# Patient Record
Sex: FEMALE | Race: BLACK OR AFRICAN AMERICAN | NOT HISPANIC OR LATINO | URBAN - METROPOLITAN AREA
[De-identification: names, ages, dates, MRNs, and addresses within clinical notes are randomized per-mention and may not be internally consistent; named-entity substitution may affect disease eponyms.]

---

## 2019-01-25 ENCOUNTER — INPATIENT (INPATIENT)
Facility: HOSPITAL | Age: 64
LOS: 3 days | Discharge: HOME | End: 2019-01-29
Attending: INTERNAL MEDICINE | Admitting: INTERNAL MEDICINE
Payer: MEDICARE

## 2019-01-26 VITALS
TEMPERATURE: 98 F | HEART RATE: 87 BPM | RESPIRATION RATE: 22 BRPM | OXYGEN SATURATION: 98 % | SYSTOLIC BLOOD PRESSURE: 151 MMHG | DIASTOLIC BLOOD PRESSURE: 64 MMHG

## 2019-01-26 DIAGNOSIS — Z29.9 ENCOUNTER FOR PROPHYLACTIC MEASURES, UNSPECIFIED: ICD-10-CM

## 2019-01-26 DIAGNOSIS — J45.909 UNSPECIFIED ASTHMA, UNCOMPLICATED: ICD-10-CM

## 2019-01-26 DIAGNOSIS — I63.9 CEREBRAL INFARCTION, UNSPECIFIED: ICD-10-CM

## 2019-01-26 DIAGNOSIS — R42 DIZZINESS AND GIDDINESS: ICD-10-CM

## 2019-01-26 DIAGNOSIS — I72.0 ANEURYSM OF CAROTID ARTERY: ICD-10-CM

## 2019-01-26 DIAGNOSIS — I10 ESSENTIAL (PRIMARY) HYPERTENSION: ICD-10-CM

## 2019-01-26 DIAGNOSIS — Z98.890 OTHER SPECIFIED POSTPROCEDURAL STATES: Chronic | ICD-10-CM

## 2019-01-26 LAB
ALBUMIN SERPL ELPH-MCNC: 4.2 G/DL — SIGNIFICANT CHANGE UP (ref 3.3–5)
ALP SERPL-CCNC: 85 U/L — SIGNIFICANT CHANGE UP (ref 40–120)
ALT FLD-CCNC: 13 U/L — SIGNIFICANT CHANGE UP (ref 4–33)
ANION GAP SERPL CALC-SCNC: 10 MMO/L — SIGNIFICANT CHANGE UP (ref 7–14)
ANION GAP SERPL CALC-SCNC: 13 MMO/L — SIGNIFICANT CHANGE UP (ref 7–14)
APPEARANCE UR: CLEAR — SIGNIFICANT CHANGE UP
APTT BLD: 27.2 SEC — LOW (ref 27.5–36.3)
AST SERPL-CCNC: 23 U/L — SIGNIFICANT CHANGE UP (ref 4–32)
BACTERIA # UR AUTO: NEGATIVE — SIGNIFICANT CHANGE UP
BASE EXCESS BLDV CALC-SCNC: 2.8 MMOL/L — SIGNIFICANT CHANGE UP
BASOPHILS # BLD AUTO: 0.01 K/UL — SIGNIFICANT CHANGE UP (ref 0–0.2)
BASOPHILS NFR BLD AUTO: 0.2 % — SIGNIFICANT CHANGE UP (ref 0–2)
BILIRUB SERPL-MCNC: 0.3 MG/DL — SIGNIFICANT CHANGE UP (ref 0.2–1.2)
BILIRUB UR-MCNC: NEGATIVE — SIGNIFICANT CHANGE UP
BLD GP AB SCN SERPL QL: NEGATIVE — SIGNIFICANT CHANGE UP
BLOOD GAS VENOUS - CREATININE: 0.86 MG/DL — SIGNIFICANT CHANGE UP (ref 0.5–1.3)
BLOOD UR QL VISUAL: NEGATIVE — SIGNIFICANT CHANGE UP
BUN SERPL-MCNC: 17 MG/DL — SIGNIFICANT CHANGE UP (ref 7–23)
BUN SERPL-MCNC: 17 MG/DL — SIGNIFICANT CHANGE UP (ref 7–23)
CALCIUM SERPL-MCNC: 8.9 MG/DL — SIGNIFICANT CHANGE UP (ref 8.4–10.5)
CALCIUM SERPL-MCNC: 9 MG/DL — SIGNIFICANT CHANGE UP (ref 8.4–10.5)
CHLORIDE BLDV-SCNC: 109 MMOL/L — HIGH (ref 96–108)
CHLORIDE SERPL-SCNC: 107 MMOL/L — SIGNIFICANT CHANGE UP (ref 98–107)
CHLORIDE SERPL-SCNC: 99 MMOL/L — SIGNIFICANT CHANGE UP (ref 98–107)
CO2 SERPL-SCNC: 25 MMOL/L — SIGNIFICANT CHANGE UP (ref 22–31)
CO2 SERPL-SCNC: 25 MMOL/L — SIGNIFICANT CHANGE UP (ref 22–31)
COLOR SPEC: YELLOW — SIGNIFICANT CHANGE UP
CREAT SERPL-MCNC: 1.06 MG/DL — SIGNIFICANT CHANGE UP (ref 0.5–1.3)
CREAT SERPL-MCNC: 1.09 MG/DL — SIGNIFICANT CHANGE UP (ref 0.5–1.3)
EOSINOPHIL # BLD AUTO: 0.04 K/UL — SIGNIFICANT CHANGE UP (ref 0–0.5)
EOSINOPHIL NFR BLD AUTO: 0.6 % — SIGNIFICANT CHANGE UP (ref 0–6)
GAS PNL BLDV: 134 MMOL/L — LOW (ref 136–146)
GLUCOSE BLDV-MCNC: 186 — HIGH (ref 70–99)
GLUCOSE SERPL-MCNC: 178 MG/DL — HIGH (ref 70–99)
GLUCOSE SERPL-MCNC: 75 MG/DL — SIGNIFICANT CHANGE UP (ref 70–99)
GLUCOSE UR-MCNC: NEGATIVE — SIGNIFICANT CHANGE UP
HBA1C BLD-MCNC: 5.1 % — SIGNIFICANT CHANGE UP (ref 4–5.6)
HCO3 BLDV-SCNC: 26 MMOL/L — SIGNIFICANT CHANGE UP (ref 20–27)
HCT VFR BLD CALC: 37 % — SIGNIFICANT CHANGE UP (ref 34.5–45)
HCT VFR BLD CALC: 39.1 % — SIGNIFICANT CHANGE UP (ref 34.5–45)
HCT VFR BLDV CALC: 40 % — SIGNIFICANT CHANGE UP (ref 34.5–45)
HGB BLD-MCNC: 11.9 G/DL — SIGNIFICANT CHANGE UP (ref 11.5–15.5)
HGB BLD-MCNC: 12.5 G/DL — SIGNIFICANT CHANGE UP (ref 11.5–15.5)
HGB BLDV-MCNC: 13 G/DL — SIGNIFICANT CHANGE UP (ref 11.5–15.5)
HYALINE CASTS # UR AUTO: NEGATIVE — SIGNIFICANT CHANGE UP
IMM GRANULOCYTES NFR BLD AUTO: 0.3 % — SIGNIFICANT CHANGE UP (ref 0–1.5)
INR BLD: 1.21 — HIGH (ref 0.88–1.17)
KETONES UR-MCNC: NEGATIVE — SIGNIFICANT CHANGE UP
LACTATE BLDV-MCNC: 1.3 MMOL/L — SIGNIFICANT CHANGE UP (ref 0.5–2)
LEUKOCYTE ESTERASE UR-ACNC: NEGATIVE — SIGNIFICANT CHANGE UP
LYMPHOCYTES # BLD AUTO: 1.04 K/UL — SIGNIFICANT CHANGE UP (ref 1–3.3)
LYMPHOCYTES # BLD AUTO: 16.4 % — SIGNIFICANT CHANGE UP (ref 13–44)
MAGNESIUM SERPL-MCNC: 2.3 MG/DL — SIGNIFICANT CHANGE UP (ref 1.6–2.6)
MCHC RBC-ENTMCNC: 32 % — SIGNIFICANT CHANGE UP (ref 32–36)
MCHC RBC-ENTMCNC: 32.2 % — SIGNIFICANT CHANGE UP (ref 32–36)
MCHC RBC-ENTMCNC: 33.1 PG — SIGNIFICANT CHANGE UP (ref 27–34)
MCHC RBC-ENTMCNC: 33.3 PG — SIGNIFICANT CHANGE UP (ref 27–34)
MCV RBC AUTO: 102.8 FL — HIGH (ref 80–100)
MCV RBC AUTO: 104.3 FL — HIGH (ref 80–100)
MONOCYTES # BLD AUTO: 0.39 K/UL — SIGNIFICANT CHANGE UP (ref 0–0.9)
MONOCYTES NFR BLD AUTO: 6.2 % — SIGNIFICANT CHANGE UP (ref 2–14)
NEUTROPHILS # BLD AUTO: 4.84 K/UL — SIGNIFICANT CHANGE UP (ref 1.8–7.4)
NEUTROPHILS NFR BLD AUTO: 76.3 % — SIGNIFICANT CHANGE UP (ref 43–77)
NITRITE UR-MCNC: NEGATIVE — SIGNIFICANT CHANGE UP
NRBC # FLD: 0 K/UL — LOW (ref 25–125)
NRBC # FLD: 0 K/UL — LOW (ref 25–125)
PCO2 BLDV: 46 MMHG — SIGNIFICANT CHANGE UP (ref 41–51)
PH BLDV: 7.39 PH — SIGNIFICANT CHANGE UP (ref 7.32–7.43)
PH UR: 6 — SIGNIFICANT CHANGE UP (ref 5–8)
PLATELET # BLD AUTO: 272 K/UL — SIGNIFICANT CHANGE UP (ref 150–400)
PLATELET # BLD AUTO: 277 K/UL — SIGNIFICANT CHANGE UP (ref 150–400)
PMV BLD: 10 FL — SIGNIFICANT CHANGE UP (ref 7–13)
PMV BLD: 10.6 FL — SIGNIFICANT CHANGE UP (ref 7–13)
PO2 BLDV: 32 MMHG — LOW (ref 35–40)
POTASSIUM BLDV-SCNC: 3 MMOL/L — LOW (ref 3.4–4.5)
POTASSIUM SERPL-MCNC: 3.2 MMOL/L — LOW (ref 3.5–5.3)
POTASSIUM SERPL-MCNC: 3.6 MMOL/L — SIGNIFICANT CHANGE UP (ref 3.5–5.3)
POTASSIUM SERPL-SCNC: 3.2 MMOL/L — LOW (ref 3.5–5.3)
POTASSIUM SERPL-SCNC: 3.6 MMOL/L — SIGNIFICANT CHANGE UP (ref 3.5–5.3)
PROT SERPL-MCNC: 7 G/DL — SIGNIFICANT CHANGE UP (ref 6–8.3)
PROT UR-MCNC: 20 — SIGNIFICANT CHANGE UP
PROTHROM AB SERPL-ACNC: 13.9 SEC — HIGH (ref 9.8–13.1)
RBC # BLD: 3.6 M/UL — LOW (ref 3.8–5.2)
RBC # BLD: 3.75 M/UL — LOW (ref 3.8–5.2)
RBC # FLD: 12.4 % — SIGNIFICANT CHANGE UP (ref 10.3–14.5)
RBC # FLD: 12.6 % — SIGNIFICANT CHANGE UP (ref 10.3–14.5)
RBC CASTS # UR COMP ASSIST: SIGNIFICANT CHANGE UP (ref 0–?)
RH IG SCN BLD-IMP: POSITIVE — SIGNIFICANT CHANGE UP
SAO2 % BLDV: 53.8 % — LOW (ref 60–85)
SODIUM SERPL-SCNC: 137 MMOL/L — SIGNIFICANT CHANGE UP (ref 135–145)
SODIUM SERPL-SCNC: 142 MMOL/L — SIGNIFICANT CHANGE UP (ref 135–145)
SP GR SPEC: 1.04 — SIGNIFICANT CHANGE UP (ref 1–1.04)
SQUAMOUS # UR AUTO: SIGNIFICANT CHANGE UP
TROPONIN T, HIGH SENSITIVITY: 8 NG/L — SIGNIFICANT CHANGE UP (ref ?–14)
TROPONIN T, HIGH SENSITIVITY: < 6 NG/L — SIGNIFICANT CHANGE UP (ref ?–14)
TSH SERPL-MCNC: 0.35 UIU/ML — SIGNIFICANT CHANGE UP (ref 0.27–4.2)
UROBILINOGEN FLD QL: NORMAL — SIGNIFICANT CHANGE UP
WBC # BLD: 4.6 K/UL — SIGNIFICANT CHANGE UP (ref 3.8–10.5)
WBC # BLD: 6.34 K/UL — SIGNIFICANT CHANGE UP (ref 3.8–10.5)
WBC # FLD AUTO: 4.6 K/UL — SIGNIFICANT CHANGE UP (ref 3.8–10.5)
WBC # FLD AUTO: 6.34 K/UL — SIGNIFICANT CHANGE UP (ref 3.8–10.5)
WBC UR QL: SIGNIFICANT CHANGE UP (ref 0–?)

## 2019-01-26 PROCEDURE — 70498 CT ANGIOGRAPHY NECK: CPT | Mod: 26

## 2019-01-26 PROCEDURE — 99223 1ST HOSP IP/OBS HIGH 75: CPT

## 2019-01-26 PROCEDURE — 70496 CT ANGIOGRAPHY HEAD: CPT | Mod: 26

## 2019-01-26 PROCEDURE — 70450 CT HEAD/BRAIN W/O DYE: CPT | Mod: 26

## 2019-01-26 RX ORDER — ENOXAPARIN SODIUM 100 MG/ML
40 INJECTION SUBCUTANEOUS EVERY 24 HOURS
Qty: 0 | Refills: 0 | Status: DISCONTINUED | OUTPATIENT
Start: 2019-01-26 | End: 2019-01-29

## 2019-01-26 RX ORDER — ASPIRIN/CALCIUM CARB/MAGNESIUM 324 MG
81 TABLET ORAL DAILY
Qty: 0 | Refills: 0 | Status: DISCONTINUED | OUTPATIENT
Start: 2019-01-27 | End: 2019-01-29

## 2019-01-26 RX ORDER — ATORVASTATIN CALCIUM 80 MG/1
80 TABLET, FILM COATED ORAL AT BEDTIME
Qty: 0 | Refills: 0 | Status: DISCONTINUED | OUTPATIENT
Start: 2019-01-26 | End: 2019-01-29

## 2019-01-26 RX ORDER — ASPIRIN/CALCIUM CARB/MAGNESIUM 324 MG
162 TABLET ORAL ONCE
Qty: 0 | Refills: 0 | Status: COMPLETED | OUTPATIENT
Start: 2019-01-26 | End: 2019-01-26

## 2019-01-26 RX ORDER — ALBUTEROL 90 UG/1
2 AEROSOL, METERED ORAL
Qty: 0 | Refills: 0 | COMMUNITY

## 2019-01-26 RX ORDER — POTASSIUM CHLORIDE 20 MEQ
40 PACKET (EA) ORAL ONCE
Qty: 0 | Refills: 0 | Status: COMPLETED | OUTPATIENT
Start: 2019-01-26 | End: 2019-01-26

## 2019-01-26 RX ADMIN — Medication 40 MILLIEQUIVALENT(S): at 13:03

## 2019-01-26 RX ADMIN — ENOXAPARIN SODIUM 40 MILLIGRAM(S): 100 INJECTION SUBCUTANEOUS at 13:03

## 2019-01-26 RX ADMIN — ATORVASTATIN CALCIUM 80 MILLIGRAM(S): 80 TABLET, FILM COATED ORAL at 23:42

## 2019-01-26 RX ADMIN — Medication 162 MILLIGRAM(S): at 01:07

## 2019-01-26 NOTE — H&P ADULT - PROBLEM SELECTOR PLAN 3
albuterol prn at home for seasonal allergies, not in distress at this time Case d/w neurology who recommended speaking with neurosurgery  -Recommend outpatient f/u with Dr. Hagan for possible serial imaging of the pseudoaneurysm

## 2019-01-26 NOTE — H&P ADULT - NEGATIVE OPHTHALMOLOGIC SYMPTOMS
no loss of vision R/no diplopia/no photophobia/no loss of vision L no loss of vision L/no loss of vision R/no diplopia/no photophobia/no blurred vision L/no blurred vision R

## 2019-01-26 NOTE — H&P ADULT - NSHPSOCIALHISTORY_GEN_ALL_CORE
Marital Status: Single, lives alone    Occupation: Retired, used to work in childcare    Tobacco Use: neg    ETOH Use: Wine socially    Flu Vaccine:     neg                             Pneumonia Vaccine:  neg

## 2019-01-26 NOTE — ED PROVIDER NOTE - OBJECTIVE STATEMENT
63F p/w difficulty speaking since 930pm this evening. patient feels like she cant get the words out of her mouth, like her mouth is very dry. adds that she also feels tingling all over her body from her toes to her fingers bilaterally. all sx are new, nothing similar in the past. denies any weakness.

## 2019-01-26 NOTE — ED PROVIDER NOTE - ATTENDING CONTRIBUTION TO CARE
Maryse: I have seen and examined the patient face to face, have reviewed and addended the HPI, PE and a/p as necessary.    62 yo F HTN, vertigo a/w difficulty speaking starting at 930PM.  Patient arrived and stroke code called from triage.  Patient seen and examined by neurology.  Patient reports feeling OK throughout today, and daughter noted to have acute change in speech with difficulty speaking.  Patient reported diffuse tingling all over her body on both sides.  Reports intermittent dizziness, Denies any weakness.      GEN - NAD; well appearing; A+O x3; non-toxic appearing CARD -s1s2, RRR, no M,G,R; PULM - CTA b/l, symmetric breath sounds; ABD:  +BS, ND, NT, soft, no guarding, no rebound, no masses; BACK: no CVA tenderness, Normal  spine; EXT: symmetric pulses, 2+ dp, capillary refill < 2 seconds, no clubbing, no cyanosis, no edema NEURO: Speech slow intact, noted to have no word finding difficulties, R gaze nystagmus which extinguishes, finger to nose intact with no dysmetry, heel to shin intact bilaterally, No focal motor deficits.      Discussed case with neurology resident, at this time patient only has slow speech, no word finding difficulties no slurred speech and no focal motor deficits, no sensory deficits.  CTH neg for acute ICH. No tPA at this time.  Will obtain an MRI MRA and likely admit for a stroke workup.

## 2019-01-26 NOTE — H&P ADULT - NEGATIVE ENMT SYMPTOMS
no hearing difficulty/no nasal congestion/no nasal obstruction/no sinus symptoms/no post-nasal discharge/no nasal discharge

## 2019-01-26 NOTE — CONSULT NOTE ADULT - PROBLEM SELECTOR RECOMMENDATION 9
peripheral vs. central origin. NIHSS 0, however with questionable intention tremor vs. very mild dysmetria, not an ideal candidate for tpa nonetheless, bleeding risks outweigh benefit.   Plan:   -CTH   -CTA H/N w/ contrast  -MRI brain w/o contrast w/ thin sections through acoustic meatus (r/o vestibular dysfunction)   -valium for dizziness symptoms  -start atleast ASA 81mg for now until MRI brain r/o stroke completed  -c/w secondary stroke prevention w/ risk factor control  -IVFs and supportive care w/ regards to nausea   -PT/OT/vestibular therapy if needed peripheral vs. central origin. NIHSS 0, however with questionable intention tremor vs. very mild dysmetria, not an ideal candidate for tpa nonetheless, bleeding risks outweigh benefit.   Plan:   -CTH   -CTA H/N w/ contrast  -MRI brain w/o contrast w/ thin sections through acoustic meatus (r/o vestibular dysfunction)   -valium for dizziness symptoms (may also help w/ speech)  -start atleast ASA 81mg for now until MRI brain r/o stroke completed  -c/w secondary stroke prevention w/ risk factor control  -IVFs and supportive care w/ regards to nausea   -PT/OT/vestibular therapy if needed

## 2019-01-26 NOTE — CONSULT NOTE ADULT - SUBJECTIVE AND OBJECTIVE BOX
Neurology Consult    Name: RADHA HODGE    HPI: 64 yo right-handed AA woman who presents to [facility] w/ new onset slowed speech, onset 930pm (1/25/19). Exact LWK not known due to patient's associated symptoms of 2 day onset of nausea and 1 day onset (self-spinning) dizziness. ROS also revealed one episode of vomiting, dry mouth, generalized non-localized weakness, and tingling (positive phenomenon) over whole body. Pertinent hx includes HTN (lifestyle controlled, 150/60 on admission), Vertigo on homeopathic medication never worked up outpatient. tPA not given on code stroke due to poor localization and mild severity of neurologic deficits. Risks of bleed outweigh benefits. NIHSS 0 MRS 0      PMH/PSH:   Asthma  HTN (lifestyle controlled)     Vertigo on homeopathic medication     MEDICATIONS  (STANDING):    MEDICATIONS  (PRN):    Allergies  penicillin (Anaphylaxis; Hives)  shellfish (Anaphylaxis; Hives)    Objective:   Vital Signs Last 24 Hrs  T(C): 36.7 (26 Jan 2019 00:01), Max: 36.7 (26 Jan 2019 00:01)  T(F): 98 (26 Jan 2019 00:01), Max: 98 (26 Jan 2019 00:01)  HR: 87 (26 Jan 2019 00:01) (87 - 87)  BP: 151/64 (26 Jan 2019 00:01) (151/64 - 151/64)  RR: 22 (26 Jan 2019 00:01) (22 - 22)  SpO2: 98% (26 Jan 2019 00:01) (98% - 98%)    General Exam:   General appearance: No acute distress                   Neurological Exam:  Mental Status: AAOx3 (person, place, time), fluent w/ normal latency but slow rate of speech, can name objects, repetition intact, follows commands    Cranial Nerves: EOMI no sustained nystagmus or diplopia, PERRL, V1-V3 intact, facial symmetry intact, no dysarthria, tongue midline, VFF    Motor: 5/5 throughout. No drift x4    Sensation: Intact to LT throughout    Coordination: FTN/HTS intact b/l    tremor: slight intention tremors b/l UE,     Reflexes: 1+ bilateral biceps, brachioradialis, patellar    toes: downgoing b/l    Gait: not assessed    Other: normal tone, + head impulse test       Labs:                Radiology    < from: CT Brain Stroke Protocol (01.26.19 @ 00:16) >    INTERPRETATION:  HISTORY: Dizziness, no focal neurologic deficits.    COMPARISON: None    TECHNIQUE: Axial noncontrast CT images from the skull base to the vertex   were obtained and submitted for interpretation. Coronal and sagittal   reformatted images were performed. Bone and soft tissue windows were   evaluated.    FINDINGS:   No CT evidence of an acute territorial infarct.  No acute hemorrhage, mass effect, midlineshift or herniation.  Basal cisterns are patent.   The ventricles, and sulci are normal in size for the patient's age.    Paranasal sinuses and mastoid air cells are clear. Calvarium is intact.   No scalp hematoma.    IMPRESSION:   No CT evidence of an acute territorial infarct.  No acute intracranial bleeding, mass effect, or shift.     These findings were discussed with Dr. Majano at 1/26/2019 12:15 AM by Dr. Bryant with read back confirmation.    < end of copied text >

## 2019-01-26 NOTE — ED ADULT TRIAGE NOTE - CHIEF COMPLAINT QUOTE
Pt c/o slurred speech, dry mouth, dizziness, pt having obvious slurred speech and difficulty forming her words, unsteady gait. Pt daughter providing hx. Pt reports symptoms onset of symptoms at 930pm.  Pt went to [facility] and they sent straight to ED.  PMHx HTN (not on meds), Asthma, Vertigo.  Denies chest pain/headaches or trauma/falls.  BGFS 150 in triage.  No facial droop noted, pt reports RUArm sensation decreased. CODE STROKE called by ED MD Sierra, brought to CT and Charge RN notified.

## 2019-01-26 NOTE — H&P ADULT - HISTORY OF PRESENT ILLNESS
62 y/o female, with a PmHx of HTN (diet controlled), Asthma, Vertigo, presented to the [facility] ED with dizziness. Pt states at about 2100hrs last night, she was sitting down watching television when she decided to get up and get something to drink. She states when she stood up she became very lightheaded, "fidgety", bilateral feet tingling and left wrist pressure.  She states she then took her BP with a home machine and is showed her bp to be 100/60. She then proceeded to call her daughter who then took her to an urgent care facility. As soon as she walked into the Urgent care facility she had an episode of vomiting and was then sent to the ED without being seen. She denies any fever, chills, chest pain, sob, recent travel. She states she did have some blurred vision and a slight HA associated with these symptoms. Her granddaughter was recently sick with a cold but she states she has had no symptoms. In the [facility] ED, a code stroke was called and she was assessed by [facility]. She was found to have an NIHSS of 0, MRS of 0. She states she is feeling much better now and she is back to her baseline. She was admitted to telemetry for r/o CVA vs Vestibular Dysfunction.    => of Note, she states she no longer takes BP meds anymore because after she started to walk about 4.5 miles everyday except Sunday and controlled her diet, her bp is now controlled 62 y/o female, with a PmHx of HTN (diet controlled), Asthma, vertigo (controlled with lavender oil), who presents to the [facility] ED with dizziness. Pt states at about 2100hrs last night, she was sitting down watching television when she decided to get up and get something to drink. She states when she stood up she became very lightheaded, "fidgety", bilateral feet tingling and left wrist pressure.  She states she then took her BP with a home machine and is showed her bp to be 100/60. She then proceeded to call her daughter who then took her to an urgent care facility. As soon as she walked into the Urgent care facility she had an episode of vomiting and was then sent to the ED without being seen. She denies any fever, chills, chest pain, sob, recent travel. She states she did have some blurred vision and a slight HA associated with these symptoms. Her granddaughter was recently sick with a cold but she states she has had no symptoms. In the [facility] ED, a code stroke was called and she was assessed by [facility]. She was found to have an NIHSS of 0, MRS of 0. She states she is feeling much better now and she is back to her baseline. She was admitted to telemetry for r/o CVA vs Vestibular dysfunction. Patient reports she does have a history of vertigo but it has never caused her to vomit and is usually does not impact her life. At present, she reports she is feeling improved and just tolerated eating lunch. She does have a bit of dizziness still but much improved from admission.     => of Note, she states she no longer takes BP meds anymore because after she started to walk about 4.5 miles everyday except Sunday and controlled her diet, her bp is now controlled

## 2019-01-26 NOTE — ED ADULT NURSE NOTE - CHIEF COMPLAINT QUOTE
Pt c/o slurred speech, dry mouth, dizziness, pt having obvious slurred speech and difficulty forming her words, unsteady gait. Pt daughter providing hx. Pt reports symptoms onset of symptoms at 930pm.  Pt went to [facility] and they sent straight to ED.  PMHx HTN (not on meds), Asthma, Vertigo.  Denies chest pain/headaches or trauma/falls.  BGFS 150 in triage.  No facial droop noted, pt reports RUArm sensation decreased. CODE STROKE called by ED MD Sierra, brought to CT and Charge RN notified.

## 2019-01-26 NOTE — H&P ADULT - PROBLEM SELECTOR PLAN 1
EKG/Telemetry, ce x 1 neg, CT head neg, CTA Head/Neck negative, House neuro c/s done, asa, statin, MRI brain to r/o cva vs vestibular dysfunction as per neuro, PT/OT/PM & R c/s, Echo w/bubble study -c/w telemetry monitoring  -f/u house neuro recommendations  -Continue with aspirin 81mg , asa, statin, MRI brain to r/o cva vs vestibular dysfunction as per neuro, PT/OT/PM & R c/s, Echo w/bubble study -c/w telemetry monitoring  -f/u house neuro recommendations  -Continue with aspirin 81mg , asa, statin, MRI brain to r/o cva vs vestibular dysfunction as per neuro,   -PT/OT/PM & R c/s  - Echo w/bubble study -c/w telemetry monitoring  -f/u house neuro recommendations  -Continue with aspirin 81mg ,start Lipitor 80mg po QHS   -MRI brain to r/o cva vs vestibular dysfunction as per neuro,   -PT/OT/PM & R c/s  - Echo w/bubble study

## 2019-01-26 NOTE — H&P ADULT - RS GEN PE MLT RESP DETAILS PC
airway patent/breath sounds equal/clear to auscultation bilaterally/good air movement/respirations non-labored/no chest wall tenderness/normal

## 2019-01-26 NOTE — H&P ADULT - PROBLEM SELECTOR PLAN 4
monitor bp, start meds if needed albuterol prn at home for seasonal allergies, not in distress at this time

## 2019-01-26 NOTE — ED ADULT NURSE NOTE - NSIMPLEMENTINTERV_GEN_ALL_ED
Implemented All Fall Risk Interventions:  [place] to call system. Call bell, personal items and telephone within reach. Instruct patient to call for assistance. Room bathroom lighting operational. Non-slip footwear when patient is off stretcher. Physically safe environment: no spills, clutter or unnecessary equipment. Stretcher in lowest position, wheels locked, appropriate side rails in place. Provide visual cue, wrist band, yellow gown, etc. Monitor gait and stability. Monitor for mental status changes and reorient to person, place, and time. Review medications for side effects contributing to fall risk. Reinforce activity limits and safety measures with patient and family.

## 2019-01-26 NOTE — H&P ADULT - ASSESSMENT
64 y/o female, with a PmHx of HTN (diet controlled), Asthma, Vertigo, presented to the [facility] ED with dizziness. She was admitted to telemetry for r/o stroke vs vestibular dysfunction. 62 y/o female, with a PmHx of HTN (diet controlled), Asthma, Vertigo, presented to the [facility] ED with dizziness. She was admitted to telemetry for r/o stroke vs vestibular dysfunction. 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

## 2019-01-26 NOTE — CONSULT NOTE ADULT - ATTENDING COMMENTS
Seen and examined on rounds with housestaff.  Presents with positional room spinning dizziness, intermittent R ear tinnitus in setting of HTN.  L hand tremoring/weakness; unclear if new.  CT head and CTA head and neck WNL.  Plan for MRI brain to r/o vascular event.   ASA 81 in the interim.

## 2019-01-26 NOTE — ED ADULT NURSE NOTE - OBJECTIVE STATEMENT
Pt received in spot 6 as a code stroke.  Pt  brought in by daughter with c/o slurred speech, dry mouth, dizziness, pt having obvious slurred speech and difficulty forming her words, unsteady gait.  since 930pm. Pt was evaluated at [facility] and sent to ED for r/o stroke.  Pt with hx of vertigo and states is experiencing "vertigo but worse".  No chest pain/HA.  No facial droop noted, but endorsing decreased sensation in R arm.  Pt unsteady on feet, with c/o tingling to B/L lower extremities.  Pt also endorsing one episode of vomiting.  Pt received from CT, IVL placed 20g to R AC.  Labs sent.  Placed on CM.  Neuro at bedside.  Report given to primary RN.

## 2019-01-26 NOTE — H&P ADULT - MUSCULOSKELETAL
details… No joint pain, swelling or deformity; no limitation of movement detailed exam no joint swelling/no joint erythema/ROM intact

## 2019-01-26 NOTE — PATIENT PROFILE ADULT - FUNCTIONAL SCREEN CURRENT LEVEL: DRESSING, MLM
Medication taken at 8 am & 1:15 pm for Lamotrigine  Blood drawn at 2:45 pm  Marguerite Sousa CMA    
0 = independent

## 2019-01-26 NOTE — H&P ADULT - NSHPLABSRESULTS_GEN_ALL_CORE
ALL LABS AND IMAGING PERSONALLY REVIEWED:                         11.9   4.60  )-----------( 277      ( 26 Jan 2019 12:40 )             37.0     01-26    142  |  107  |  17  ----------------------------<  75  3.6   |  25  |  1.09    Ca    8.9      26 Jan 2019 12:40  Mg     2.3     01-26    TPro  7.0  /  Alb  4.2  /  TBili  0.3  /  DBili  x   /  AST  23  /  ALT  13  /  AlkPhos  85  01-26    PT/INR - ( 26 Jan 2019 00:10 )   PT: 13.9 SEC;   INR: 1.21          PTT - ( 26 Jan 2019 00:10 )  PTT:27.2 SEC    Hemoglobin A1C, Whole Blood: 5.1  Thyroid Stimulating Hormone, Serum: 0.35 uIU/mL (01.26.19 @ 12:40)    EKG: NSR @72bpm, LVH, IVE=570    < from: CT Angio Neck w/ IV Cont (01.26.19 @ 02:41) >    IMPRESSION:       POSTCONTRAST HEAD CT:    No hydrocephalus, midline shift, parenchymal hemorrhage, vasogenic edema,   or evidence of acute territorial infarct.    INTRACRANIAL CTA:    No vascular aneurysm or flow-limiting stenosis.    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 The neck measures 2 to 3 mm in size. Findings likely   represent the presence of pseudoaneurysm in the setting of a healed   arterial dissection.    No flow-limiting stenosis. Carotid bifurcations unremarkable. No evidence   for acute arterial dissection.    < end of copied text >    < from: CT Brain Stroke Protocol (01.26.19 @ 00:16) >    IMPRESSION:   No CT evidence of an acute territorial infarct.  No acute intracranial bleeding, mass effect, or shift.     < end of copied text >

## 2019-01-26 NOTE — H&P ADULT - PROBLEM SELECTOR PLAN 2
if stoke w/u negative, consider meclizine Patient reports this well-controlled at home with lavender oil  -Can consider meclizine if symptoms persist  -f/u MRI results to assess for vestibular dysfunction per neurology

## 2019-01-26 NOTE — CONSULT NOTE ADULT - ASSESSMENT
62 yo right-handed AA woman who presents to [facility] w/ new onset slowed speech, onset 930pm (1/25/19). Exact LWK not known due to patient's associated symptoms of 2 day onset of nausea and 1 day onset (self-spinning) dizziness. ROS also revealed one episode of vomiting, dry mouth, generalized non-localized weakness, and tingling (positive phenomenon) over whole body. Pertinent hx includes HTN (lifestyle controlled, 150/60 on admission), Vertigo on homeopathic medication never worked up outpatient. tPA not given on code stroke due to poor localization and mild severity of neurologic deficits. Risks of bleed outweigh benefits. NIHSS 0 MRS 0      Pertinent positives on exam include slow rate of speech but fluent and non dysarthric. head impulse test +, b/l tremor w/ outstretched hand vs. possible but less likely mild dysmetria w/ FTN.

## 2019-01-26 NOTE — H&P ADULT - NSHPPHYSICALEXAM_GEN_ALL_CORE
Vital Signs Last 24 Hrs  T(C): 36.6 (26 Jan 2019 09:42), Max: 36.8 (26 Jan 2019 03:32)  T(F): 97.8 (26 Jan 2019 09:42), Max: 98.2 (26 Jan 2019 03:32)  HR: 69 (26 Jan 2019 09:42) (69 - 87)  BP: 112/64 (26 Jan 2019 09:42) (109/62 - 151/64)  BP(mean): --  RR: 17 (26 Jan 2019 09:42) (17 - 22)  SpO2: 97% (26 Jan 2019 09:42) (94% - 98%)

## 2019-01-26 NOTE — H&P ADULT - FAMILY HISTORY
Mother  Still living? No  Family history of cerebrovascular accident (CVA), Age at diagnosis: Age Unknown  Family history of glaucoma in mother, Age at diagnosis: Age Unknown  Family history of hypertension, Age at diagnosis: Age Unknown

## 2019-01-27 LAB
ANION GAP SERPL CALC-SCNC: 8 MMO/L — SIGNIFICANT CHANGE UP (ref 7–14)
BUN SERPL-MCNC: 17 MG/DL — SIGNIFICANT CHANGE UP (ref 7–23)
CALCIUM SERPL-MCNC: 8.8 MG/DL — SIGNIFICANT CHANGE UP (ref 8.4–10.5)
CHLORIDE SERPL-SCNC: 105 MMOL/L — SIGNIFICANT CHANGE UP (ref 98–107)
CHOLEST SERPL-MCNC: 128 MG/DL — SIGNIFICANT CHANGE UP (ref 120–199)
CO2 SERPL-SCNC: 26 MMOL/L — SIGNIFICANT CHANGE UP (ref 22–31)
CREAT SERPL-MCNC: 0.96 MG/DL — SIGNIFICANT CHANGE UP (ref 0.5–1.3)
GLUCOSE SERPL-MCNC: 90 MG/DL — SIGNIFICANT CHANGE UP (ref 70–99)
HCT VFR BLD CALC: 36.8 % — SIGNIFICANT CHANGE UP (ref 34.5–45)
HDLC SERPL-MCNC: 55 MG/DL — SIGNIFICANT CHANGE UP (ref 45–65)
HGB BLD-MCNC: 12.4 G/DL — SIGNIFICANT CHANGE UP (ref 11.5–15.5)
LIPID PNL WITH DIRECT LDL SERPL: 76 MG/DL — SIGNIFICANT CHANGE UP
MCHC RBC-ENTMCNC: 33.7 % — SIGNIFICANT CHANGE UP (ref 32–36)
MCHC RBC-ENTMCNC: 34.3 PG — HIGH (ref 27–34)
MCV RBC AUTO: 101.7 FL — HIGH (ref 80–100)
NRBC # FLD: 0 K/UL — LOW (ref 25–125)
PLATELET # BLD AUTO: 273 K/UL — SIGNIFICANT CHANGE UP (ref 150–400)
PMV BLD: 10.3 FL — SIGNIFICANT CHANGE UP (ref 7–13)
POTASSIUM SERPL-MCNC: 3.8 MMOL/L — SIGNIFICANT CHANGE UP (ref 3.5–5.3)
POTASSIUM SERPL-SCNC: 3.8 MMOL/L — SIGNIFICANT CHANGE UP (ref 3.5–5.3)
RBC # BLD: 3.62 M/UL — LOW (ref 3.8–5.2)
RBC # FLD: 12.8 % — SIGNIFICANT CHANGE UP (ref 10.3–14.5)
SODIUM SERPL-SCNC: 139 MMOL/L — SIGNIFICANT CHANGE UP (ref 135–145)
TRIGL SERPL-MCNC: 30 MG/DL — SIGNIFICANT CHANGE UP (ref 10–149)
WBC # BLD: 2.67 K/UL — LOW (ref 3.8–10.5)
WBC # FLD AUTO: 2.67 K/UL — LOW (ref 3.8–10.5)

## 2019-01-27 PROCEDURE — 99233 SBSQ HOSP IP/OBS HIGH 50: CPT

## 2019-01-27 RX ADMIN — ENOXAPARIN SODIUM 40 MILLIGRAM(S): 100 INJECTION SUBCUTANEOUS at 12:14

## 2019-01-27 RX ADMIN — Medication 81 MILLIGRAM(S): at 12:14

## 2019-01-27 RX ADMIN — ATORVASTATIN CALCIUM 80 MILLIGRAM(S): 80 TABLET, FILM COATED ORAL at 21:32

## 2019-01-27 NOTE — PROGRESS NOTE ADULT - ASSESSMENT
62 y/o W, with a PmHx of HTN (diet controlled), Asthma, Vertigo, presented to the [facility] ED with dizziness. She was admitted to telemetry for r/o stroke vs vestibular dysfunction. 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

## 2019-01-27 NOTE — PROGRESS NOTE ADULT - SUBJECTIVE AND OBJECTIVE BOX
Patient is a 63y old  Female who presents with a chief complaint of Dizziness (2019 12:29)      SUBJECTIVE / OVERNIGHT EVENTS: No events overnight. This AM, patient states dizziness is improved. 1 episode of vomiting last night. No f/c/n/v/d/cp/sob.    MEDICATIONS  (STANDING):  aspirin enteric coated 81 milliGRAM(s) Oral daily  atorvastatin 80 milliGRAM(s) Oral at bedtime  enoxaparin Injectable 40 milliGRAM(s) SubCutaneous every 24 hours    MEDICATIONS  (PRN):      PHYSICAL EXAM:  T(C): 36.6 (19 @ 12:13), Max: 36.8 (19 @ 14:52)  HR: 68 (19 @ 12:13) (62 - 86)  BP: 139/79 (19 @ 12:13) (117/59 - 148/85)  RR: 18 (19 @ 12:13) (17 - 18)  SpO2: 98% (19 @ 12:13) (96% - 99%)  I&O's Summary    GENERAL: NAD, well-developed, seated in bed, pleasant  HEAD:  Atraumatic, Normocephalic, MMM  EYES: EOMI, PERRLA, conjunctiva and sclera clear  CHEST/LUNG: Clear to auscultation bilaterally; No wheeze  HEART: Regular rate and rhythm; No murmurs, rubs, or gallops  ABDOMEN: Soft, Nontender, Nondistended; Bowel sounds present  EXTREMITIES:  2+ Peripheral Pulses, No clubbing, cyanosis, or edema  PSYCH: AAOx3  NEUROLOGY: CN II-XII grossly intact, moving all extremities    LABS:  CAPILLARY BLOOD GLUCOSE                              12.4   2.67  )-----------( 273      ( 2019 06:45 )             36.8         139  |  105  |  17  ----------------------------<  90  3.8   |  26  |  0.96    Ca    8.8      2019 06:45  Mg     2.3         TPro  7.0  /  Alb  4.2  /  TBili  0.3  /  DBili  x   /  AST  23  /  ALT  13  /  AlkPhos  85      PT/INR - ( 2019 00:10 )   PT: 13.9 SEC;   INR: 1.21          PTT - ( 2019 00:10 )  PTT:27.2 SEC      Urinalysis Basic - ( 2019 20:00 )    Color: YELLOW / Appearance: CLEAR / S.036 / pH: 6.0  Gluc: NEGATIVE / Ketone: NEGATIVE  / Bili: NEGATIVE / Urobili: NORMAL   Blood: NEGATIVE / Protein: 20 / Nitrite: NEGATIVE   Leuk Esterase: NEGATIVE / RBC: 0-2 / WBC 3-5   Sq Epi: FEW / Non Sq Epi: x / Bacteria: NEGATIVE        RADIOLOGY & ADDITIONAL TESTS:    Telemetry Personally Reviewed - Sinus rhythm, 60s    Imaging Personally Reviewed -     Imaging Reviewed -     Consultant(s) Notes Reviewed -       Care Discussed with Consultants/Other Providers -

## 2019-01-27 NOTE — PROGRESS NOTE ADULT - PROBLEM SELECTOR PLAN 2
Patient reports this well-controlled at home with lavender oil  -Can consider meclizine if symptoms persist  -f/u MRI results to assess for vestibular dysfunction per neurology

## 2019-01-27 NOTE — PHYSICAL THERAPY INITIAL EVALUATION ADULT - PERTINENT HX OF CURRENT PROBLEM, REHAB EVAL
Pt. admitted with dizziness, r/o CVA. (-) CT head, however, (+) pseudoaneurysm. Pt. has hx of vertigo.

## 2019-01-27 NOTE — PROGRESS NOTE ADULT - PROBLEM SELECTOR PLAN 3
Admitting attending d/w neurology who recommended speaking with neurosurgery  -Neurosurgery recommend outpatient f/u with Dr. Hagan for possible serial imaging of the pseudoaneurysm

## 2019-01-27 NOTE — PROGRESS NOTE ADULT - PROBLEM SELECTOR PLAN 1
Dizziness is improving.  -c/w telemetry monitoring, no events thus far  -Continue with aspirin 81m, Lipitor 80mg po QHS   -MRI brain to r/o cva vs vestibular dysfunction as per neuro,   -PT - no skilled needs  - f/u OT/PM & R c/s  - Echo w/bubble study

## 2019-01-28 ENCOUNTER — TRANSCRIPTION ENCOUNTER (OUTPATIENT)
Age: 64
End: 2019-01-28

## 2019-01-28 LAB
ANION GAP SERPL CALC-SCNC: 11 MMO/L — SIGNIFICANT CHANGE UP (ref 7–14)
BUN SERPL-MCNC: 14 MG/DL — SIGNIFICANT CHANGE UP (ref 7–23)
CALCIUM SERPL-MCNC: 9.4 MG/DL — SIGNIFICANT CHANGE UP (ref 8.4–10.5)
CHLORIDE SERPL-SCNC: 104 MMOL/L — SIGNIFICANT CHANGE UP (ref 98–107)
CO2 SERPL-SCNC: 25 MMOL/L — SIGNIFICANT CHANGE UP (ref 22–31)
CREAT SERPL-MCNC: 0.98 MG/DL — SIGNIFICANT CHANGE UP (ref 0.5–1.3)
GLUCOSE SERPL-MCNC: 82 MG/DL — SIGNIFICANT CHANGE UP (ref 70–99)
HCT VFR BLD CALC: 40 % — SIGNIFICANT CHANGE UP (ref 34.5–45)
HGB BLD-MCNC: 12.8 G/DL — SIGNIFICANT CHANGE UP (ref 11.5–15.5)
MAGNESIUM SERPL-MCNC: 2.1 MG/DL — SIGNIFICANT CHANGE UP (ref 1.6–2.6)
MCHC RBC-ENTMCNC: 32 % — SIGNIFICANT CHANGE UP (ref 32–36)
MCHC RBC-ENTMCNC: 33.8 PG — SIGNIFICANT CHANGE UP (ref 27–34)
MCV RBC AUTO: 105.5 FL — HIGH (ref 80–100)
NRBC # FLD: 0 K/UL — LOW (ref 25–125)
PLATELET # BLD AUTO: 282 K/UL — SIGNIFICANT CHANGE UP (ref 150–400)
PMV BLD: 10.5 FL — SIGNIFICANT CHANGE UP (ref 7–13)
POTASSIUM SERPL-MCNC: 4.1 MMOL/L — SIGNIFICANT CHANGE UP (ref 3.5–5.3)
POTASSIUM SERPL-SCNC: 4.1 MMOL/L — SIGNIFICANT CHANGE UP (ref 3.5–5.3)
RBC # BLD: 3.79 M/UL — LOW (ref 3.8–5.2)
RBC # FLD: 12.5 % — SIGNIFICANT CHANGE UP (ref 10.3–14.5)
SODIUM SERPL-SCNC: 140 MMOL/L — SIGNIFICANT CHANGE UP (ref 135–145)
WBC # BLD: 4 K/UL — SIGNIFICANT CHANGE UP (ref 3.8–10.5)
WBC # FLD AUTO: 4 K/UL — SIGNIFICANT CHANGE UP (ref 3.8–10.5)

## 2019-01-28 PROCEDURE — 99232 SBSQ HOSP IP/OBS MODERATE 35: CPT

## 2019-01-28 PROCEDURE — 93306 TTE W/DOPPLER COMPLETE: CPT | Mod: 26

## 2019-01-28 PROCEDURE — 70551 MRI BRAIN STEM W/O DYE: CPT | Mod: 26

## 2019-01-28 RX ADMIN — Medication 81 MILLIGRAM(S): at 11:40

## 2019-01-28 RX ADMIN — ENOXAPARIN SODIUM 40 MILLIGRAM(S): 100 INJECTION SUBCUTANEOUS at 12:21

## 2019-01-28 RX ADMIN — ATORVASTATIN CALCIUM 80 MILLIGRAM(S): 80 TABLET, FILM COATED ORAL at 21:38

## 2019-01-28 NOTE — OCCUPATIONAL THERAPY INITIAL EVALUATION ADULT - PERTINENT HX OF CURRENT PROBLEM, REHAB EVAL
Pt is a 63 year old female with a PMHx of HTN (diet controlled), Asthma, & vertigo, who presented to [facility] on 1/26/19 with dizziness. CT Brain Scan on 1/26/19 displayed no CT evidence of an acute territorial infarct. No acute intracranial bleeding, mass effect, or shift.

## 2019-01-28 NOTE — DISCHARGE NOTE ADULT - CARE PROVIDER_API CALL
Lisandro Hagan), Neurological Surgery  [address] [place] [address]  Phone: [phone]  Fax: [phone] Lisandro Hagan), Neurological Surgery  [address]  [address] [place] [address]  Phone: [phone]  Fax: [phone]    Neurology Clinic,   [phone]  [address] [place] [address]. [address], [place] [address]  Phone: (   )    -  Fax: (   )    -

## 2019-01-28 NOTE — DISCHARGE NOTE ADULT - HOSPITAL COURSE
HPI:   62 y/o female, with a PmHx of HTN (diet controlled), Asthma, vertigo (controlled with lavender oil), who presents to the [facility] ED with dizziness. Pt states at about 2100hrs last night, she was sitting down watching television when she decided to get up and get something to drink. She states when she stood up she became very lightheaded, "fidgety", bilateral feet tingling and left wrist pressure.  She states she then took her BP with a home machine and is showed her bp to be 100/60. She then proceeded to call her daughter who then took her to an urgent care facility. As soon as she walked into the Urgent care facility she had an episode of vomiting and was then sent to the ED without being seen. She denies any fever, chills, chest pain, sob, recent travel. She states she did have some blurred vision and a slight HA associated with these symptoms. Her granddaughter was recently sick with a cold but she states she has had no symptoms. In the [facility] ED, a code stroke was called and she was assessed by [facility]. She was found to have an NIHSS of 0, MRS of 0. She states she is feeling much better now and she is back to her baseline. She was admitted to telemetry for r/o CVA vs Vestibular dysfunction. Patient reports she does have a history of vertigo but it has never caused her to vomit and is usually does not impact her life. At present, she reports she is feeling improved and just tolerated eating lunch. She does have a bit of dizziness still but much improved from admission.   => of Note, she states she no longer takes BP meds anymore because after she started to walk about 4.5 miles everyday except Sunday and controlled her diet, her bp is now controlled  EKG: NSR     Hospital Course:  Neurology evaluated the patient. NIHSS 0 MRS 0. Neuroimaging ordered to r/o vestibular dysfunction.      CT HEAD: No CT evidence of an acute territorial infarct. No acute intracranial bleeding, mass effect, or shift.   POSTCONTRAST HEAD CT: No hydrocephalus, midline shift, parenchymal hemorrhage, vasogenic edema, or evidence of acute territorial infarct.  INTRACRANIAL CTA: No vascular aneurysm or flow-limiting stenosis.  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 The neck measures 2 to 3 mm in size. 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 No flow-limiting stenosis. Carotid bifurcations unremarkable. No evidence for acute arterial dissection.  MRI HEAD: Mild microvascular ischemic change. No abnormal signal or filling defect within either IAC. No abnormal signal or filling defect within either membranous labyrinth.      Neurosurgery recommend outpatient f/u with Dr. Hagan for possible serial imaging of the pseudoaneurysm.   Physical Therapy Evaluation: Home with no PT needs.  OT Evaluation: Home without OT services       CTA head and neck- posteriorly directed aneurysm of the distal right internal carotid artery. The dome measures 2 to 3 mm in size. The neck measures 2 to 3 mm in size. 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     Case discussed with Dr. Eileen hernandez ............The patient is medically stable for discharge home and has appropriate follow up. HPI:   64 y/o female, with a PmHx of HTN (diet controlled), Asthma, vertigo (controlled with lavender oil), who presents to the [facility] ED with dizziness. Pt states at about 2100hrs last night, she was sitting down watching television when she decided to get up and get something to drink. She states when she stood up she became very lightheaded, "fidgety", bilateral feet tingling and left wrist pressure.  She states she then took her BP with a home machine and is showed her bp to be 100/60. She then proceeded to call her daughter who then took her to an urgent care facility. As soon as she walked into the Urgent care facility she had an episode of vomiting and was then sent to the ED without being seen. She denies any fever, chills, chest pain, sob, recent travel. She states she did have some blurred vision and a slight HA associated with these symptoms. Her granddaughter was recently sick with a cold but she states she has had no symptoms. In the [facility] ED, a code stroke was called and she was assessed by [facility]. She was found to have an NIHSS of 0, MRS of 0. She states she is feeling much better now and she is back to her baseline. She was admitted to telemetry for r/o CVA vs Vestibular dysfunction. Patient reports she does have a history of vertigo but it has never caused her to vomit and is usually does not impact her life. At present, she reports she is feeling improved and just tolerated eating lunch. She does have a bit of dizziness still but much improved from admission.   => of Note, she states she no longer takes BP meds anymore because after she started to walk about 4.5 miles everyday except Sunday and controlled her diet, her bp is now controlled  EKG: NSR     Hospital Course:  Neurology evaluated the patient. NIHSS 0 MRS 0. Neuroimaging ordered to rule out vestibular dysfunction.  Patient monitored on telemetry without events.     CT HEAD: No CT evidence of an acute territorial infarct. No acute intracranial bleeding, mass effect, or shift.   POSTCONTRAST HEAD CT: No hydrocephalus, midline shift, parenchymal hemorrhage, vasogenic edema, or evidence of acute territorial infarct.  INTRACRANIAL CTA: No vascular aneurysm or flow-limiting stenosis.  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 The neck measures 2 to 3 mm in size. 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 No flow-limiting stenosis. Carotid bifurcations unremarkable. No evidence for acute arterial dissection.  MRI HEAD: Mild microvascular ischemic change. No abnormal signal or filling defect within either IAC. No abnormal signal or filling defect within either membranous labyrinth.    Neurosurgery recommend outpatient follow up with Dr. Hagan for possible serial imaging of the pseudoaneurysm. 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    TTE:     Physical Therapy Evaluation: Home with no PT needs.  OT Evaluation: Home without OT services     Case discussed with Dr. Arellano on ............The patient is medically stable for discharge home and has appropriate follow up. HPI:   62 y/o female, with a PmHx of HTN (diet controlled), Asthma, vertigo (controlled with lavender oil), who presents to the [facility] ED with dizziness. Pt states at about 2100hrs last night, she was sitting down watching television when she decided to get up and get something to drink. She states when she stood up she became very lightheaded, "fidgety", bilateral feet tingling and left wrist pressure.  She states she then took her BP with a home machine and is showed her bp to be 100/60. She then proceeded to call her daughter who then took her to an urgent care facility. As soon as she walked into the Urgent care facility she had an episode of vomiting and was then sent to the ED without being seen. She denies any fever, chills, chest pain, sob, recent travel. She states she did have some blurred vision and a slight HA associated with these symptoms. Her granddaughter was recently sick with a cold but she states she has had no symptoms. In the [facility] ED, a code stroke was called and she was assessed by [facility]. She was found to have an NIHSS of 0, MRS of 0. She states she is feeling much better now and she is back to her baseline. She was admitted to telemetry for r/o CVA vs Vestibular dysfunction. Patient reports she does have a history of vertigo but it has never caused her to vomit and is usually does not impact her life. At present, she reports she is feeling improved and just tolerated eating lunch. She does have a bit of dizziness still but much improved from admission.   => of Note, she states she no longer takes BP meds anymore because after she started to walk about 4.5 miles everyday except Sunday and controlled her diet, her bp is now controlled  EKG: NSR     Hospital Course:  Neurology evaluated the patient. NIHSS 0 MRS 0. Neuroimaging ordered to rule out vestibular dysfunction.  Patient monitored on telemetry without events.     CT HEAD: No CT evidence of an acute territorial infarct. No acute intracranial bleeding, mass effect, or shift.   POSTCONTRAST HEAD CT: No hydrocephalus, midline shift, parenchymal hemorrhage, vasogenic edema, or evidence of acute territorial infarct.  INTRACRANIAL CTA: No vascular aneurysm or flow-limiting stenosis.  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 The neck measures 2 to 3 mm in size. 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 No flow-limiting stenosis. Carotid bifurcations unremarkable. No evidence for acute arterial dissection.  MRI HEAD: Mild microvascular ischemic change. No abnormal signal or filling defect within either IAC. No abnormal signal or filling defect within either membranous labyrinth.    Neurosurgery recommend outpatient follow up with Dr. Hagan for possible serial imaging of the pseudoaneurysm. 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    TTE:   1. Normal trileaflet aortic valve. Mild aortic regurgitation.  2. Increased relative wall thickness with normal left ventricular mass index, consistent with concentric left ventricular remodeling.  3. No segmental wall motion abnormalities. Hyperdynamic left ventricle.  4. Normal right ventricular size and function.  5. Normal tricuspid valve. Mild tricuspid regurgitation.  6. Agitated saline injection and color flow Doppler demonstrate evidence of a possible intracardiac shunt.  EF 65%    Physical Therapy Evaluation: Home with no PT needs.  OT Evaluation: Home without OT services     Case discussed with Dr. Arellano on ............The patient is medically stable for discharge home and has appropriate follow up. HPI:   64 y/o female, with a PmHx of HTN (diet controlled), Asthma, vertigo (controlled with lavender oil), who presents to the [facility] ED with dizziness. Pt states at about 2100hrs last night, she was sitting down watching television when she decided to get up and get something to drink. She states when she stood up she became very lightheaded, "fidgety", bilateral feet tingling and left wrist pressure.  She states she then took her BP with a home machine and is showed her bp to be 100/60. She then proceeded to call her daughter who then took her to an urgent care facility. As soon as she walked into the Urgent care facility she had an episode of vomiting and was then sent to the ED without being seen. She denies any fever, chills, chest pain, sob, recent travel. She states she did have some blurred vision and a slight HA associated with these symptoms. Her granddaughter was recently sick with a cold but she states she has had no symptoms. In the [facility] ED, a code stroke was called and she was assessed by [facility]. She was found to have an NIHSS of 0, MRS of 0. She states she is feeling much better now and she is back to her baseline. She was admitted to telemetry for r/o CVA vs Vestibular dysfunction. Patient reports she does have a history of vertigo but it has never caused her to vomit and is usually does not impact her life. At present, she reports she is feeling improved and just tolerated eating lunch. She does have a bit of dizziness still but much improved from admission.   => of Note, she states she no longer takes BP meds anymore because after she started to walk about 4.5 miles everyday except Sunday and controlled her diet, her bp is now controlled  EKG: NSR     Hospital Course:  Neurology evaluated the patient. NIHSS 0 MRS 0. Neuroimaging ordered to rule out vestibular dysfunction.  Patient monitored on telemetry without events.     CT HEAD: No CT evidence of an acute territorial infarct. No acute intracranial bleeding, mass effect, or shift.   POSTCONTRAST HEAD CT: No hydrocephalus, midline shift, parenchymal hemorrhage, vasogenic edema, or evidence of acute territorial infarct.  INTRACRANIAL CTA: No vascular aneurysm or flow-limiting stenosis.  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 The neck measures 2 to 3 mm in size. 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 No flow-limiting stenosis. Carotid bifurcations unremarkable. No evidence for acute arterial dissection.  MRI HEAD: Mild microvascular ischemic change. No abnormal signal or filling defect within either IAC. No abnormal signal or filling defect within either membranous labyrinth.    Neurosurgery recommend outpatient follow up with Dr. Hagan for possible serial imaging of the pseudoaneurysm. 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    TTE:   1. Normal trileaflet aortic valve. Mild aortic regurgitation.  2. Increased relative wall thickness with normal left ventricular mass index, consistent with concentric left ventricular remodeling.  3. No segmental wall motion abnormalities. Hyperdynamic left ventricle.  4. Normal right ventricular size and function.  5. Normal tricuspid valve. Mild tricuspid regurgitation.  6. Agitated saline injection and color flow Doppler demonstrate evidence of a possible intracardiac shunt.  EF 65%    The above findings were discussed with neurology as per Dr. Arellano- discharge diagnosis TIA. Patient to follow up with neurology outpatient for further care. Appt made for Feb 1st at 9:30AM.     Physical Therapy Evaluation: Home with no PT needs.  OT Evaluation: Home without OT services     Case discussed with Dr. Arellano on 1/29/19. The patient is medically stable for discharge home and has appropriate follow up. HPI:   62 y/o female, with a PmHx of HTN (diet controlled), Asthma, vertigo (controlled with lavender oil), who presents to the [facility] ED with dizziness. Pt states at about 2100hrs last night, she was sitting down watching television when she decided to get up and get something to drink. She states when she stood up she became very lightheaded, "fidgety", bilateral feet tingling and left wrist pressure.  She states she then took her BP with a home machine and is showed her bp to be 100/60. She then proceeded to call her daughter who then took her to an urgent care facility. As soon as she walked into the Urgent care facility she had an episode of vomiting and was then sent to the ED without being seen. She denies any fever, chills, chest pain, sob, recent travel. She states she did have some blurred vision and a slight HA associated with these symptoms. Her granddaughter was recently sick with a cold but she states she has had no symptoms. In the [facility] ED, a code stroke was called and she was assessed by [facility]. She was found to have an NIHSS of 0, MRS of 0. She states she is feeling much better now and she is back to her baseline. She was admitted to telemetry for r/o CVA vs Vestibular dysfunction. Patient reports she does have a history of vertigo but it has never caused her to vomit and is usually does not impact her life. At present, she reports she is feeling improved and just tolerated eating lunch. She does have a bit of dizziness still but much improved from admission.   => of Note, she states she no longer takes BP meds anymore because after she started to walk about 4.5 miles everyday except Sunday and controlled her diet, her bp is now controlled  EKG: NSR     Hospital Course:  Neurology evaluated the patient. NIHSS 0 MRS 0. Neuroimaging ordered to rule out vestibular dysfunction.  Patient monitored on telemetry without events.     CT HEAD: No CT evidence of an acute territorial infarct. No acute intracranial bleeding, mass effect, or shift.   POSTCONTRAST HEAD CT: No hydrocephalus, midline shift, parenchymal hemorrhage, vasogenic edema, or evidence of acute territorial infarct.  INTRACRANIAL CTA: No vascular aneurysm or flow-limiting stenosis.  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 The neck measures 2 to 3 mm in size. 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 No flow-limiting stenosis. Carotid bifurcations unremarkable. No evidence for acute arterial dissection.  MRI HEAD: Mild microvascular ischemic change. No abnormal signal or filling defect within either IAC. No abnormal signal or filling defect within either membranous labyrinth.    Neurosurgery recommend outpatient follow up with Dr. Hagan for possible serial imaging of the pseudoaneurysm. 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    TTE:   1. Normal trileaflet aortic valve. Mild aortic regurgitation.  2. Increased relative wall thickness with normal left ventricular mass index, consistent with concentric left ventricular remodeling.  3. No segmental wall motion abnormalities. Hyperdynamic left ventricle.  4. Normal right ventricular size and function.  5. Normal tricuspid valve. Mild tricuspid regurgitation.  6. Agitated saline injection and color flow Doppler demonstrate evidence of a possible intracardiac shunt.  EF 65%    LE DOPPLERS:  1.  No evidence of deep vein thrombosis in the right and left lower extremities.  2.  No evidence of deep and superficial venous insufficiency noted in the right and left lower extremities.     The above findings were discussed with neurology as per Dr. Arellano- discharge diagnosis TIA. Patient to follow up with neurology outpatient for further care. Appt made for Feb 1st at 9:30AM.     Physical Therapy Evaluation: Home with no PT needs.  OT Evaluation: Home without OT services     Case discussed with Dr. Arellano on 1/29/19. The patient is medically stable for discharge home and has appropriate follow up.

## 2019-01-28 NOTE — PROGRESS NOTE ADULT - SUBJECTIVE AND OBJECTIVE BOX
Patient is a 63y old  Female who presents with a chief complaint of Dizziness (2019 13:45)      SUBJECTIVE / OVERNIGHT EVENTS: Pt in NAD Tele NSR no accute events ON. daughter at bedside states walking to the bathrooom     MEDICATIONS  (STANDING):  aspirin enteric coated 81 milliGRAM(s) Oral daily  atorvastatin 80 milliGRAM(s) Oral at bedtime  enoxaparin Injectable 40 milliGRAM(s) SubCutaneous every 24 hours    MEDICATIONS  (PRN):        CAPILLARY BLOOD GLUCOSE        I&O's Summary      T(C): 36.7 (19 @ 06:15), Max: 36.8 (19 @ 21:01)  HR: 66 (19 @ 06:15) (66 - 69)  BP: 147/87 (19 @ 06:15) (139/79 - 159/84)  RR: 169 (19 @ 06:15) (18 - 169)  SpO2: 97% (19 @ 21:01) (97% - 98%)    PHYSICAL EXAM:  GENERAL: NAD, well-developed  HEAD:  Atraumatic, Normocephalic  EYES: EOMI, PERRLA, conjunctiva and sclera clear  NECK: Supple, No JVD  CHEST/LUNG: Clear to auscultation bilaterally; No wheeze  HEART: Regular rate and rhythm; No murmurs, rubs, or gallops  ABDOMEN: Soft, Nontender, Nondistended; Bowel sounds present  EXTREMITIES:  2+ Peripheral Pulses, No clubbing, cyanosis, or edema  PSYCH: AAOx3  NEUROLOGY: non-focal  SKIN: No rashes or lesions    LABS:                        12.8   4.00  )-----------( 282      ( 2019 06:20 )             40.0         140  |  104  |  14  ----------------------------<  82  4.1   |  25  |  0.98    Ca    9.4      2019 06:20  Mg     2.1                 Urinalysis Basic - ( 2019 20:00 )    Color: YELLOW / Appearance: CLEAR / S.036 / pH: 6.0  Gluc: NEGATIVE / Ketone: NEGATIVE  / Bili: NEGATIVE / Urobili: NORMAL   Blood: NEGATIVE / Protein: 20 / Nitrite: NEGATIVE   Leuk Esterase: NEGATIVE / RBC: 0-2 / WBC 3-5   Sq Epi: FEW / Non Sq Epi: x / Bacteria: NEGATIVE          RADIOLOGY & ADDITIONAL TESTS:    Imaging Personally Reviewed:< from: MR Head No Cont (19 @ 10:00) >  EXAM:  MR BRAIN        PROCEDURE DATE:  2019         INTERPRETATION:  History: difficulty speaking  r/o vestibular dysfunction   64 y/o female, with a PmHx of HTN, Asthma, Vertigo.  ADMDIAG1: I63.9   I63.9/    TECHNIQUE: Noncontrast brain and IAC MRI was performed.    Through the brain, sagittal T1, axial DWI, ADC map, T2, and FLAIR and   T1-weighted sequences were obtained.    Through the IAC, coronal and axial T1, axial T2 and 3-D FIESTA sequence   through the IAC were performed.    COMPARISON: CT head dated 2019.    FINDINGS:  Brain MRI:  The ventricles and sulci are within normal limits. There are   periventricular and hemispheric white matter foci of hyperintense   T2/FLAIR signal abnormalities consistent with mild microvascular ischemic   change. A small chronic right AICA territory infarction involving the   right anterior inferior cerebellum is seen.     There is no intraparenchymal hematoma, mass effect or midline shift. No   abnormal extra-axial fluid collections are present. There is no diffusion   abnormality to suggest acute or subacute infarction. Major flow-voids at   the base of the brain follow the expected course and contour.    The calvarium is intact. Visualized intraorbital compartments, paranasal   sinuses and mastoid complexes appear free of acute disease.      IAC MRI:  There is no mass in either cerebellopontine angle cistern.    Cranial nerves VII and VIII are normally seen within the cerebellopontine   angle cisterns and internal auditory canals without evidence for   nodularity, or abnormal thickening.    The cochlea are fluid filled bilaterally without filling defects. The   vestibule and semicircular canals are fluid filled without filling   defects. There is no abnormal signal within the inner ear, including the   membranous labyrinth or otic capsule. There is no abnormal signal within   the bony confines of cranial nerve VII.    There is no abnormal signal within either tympanic cavity or external   auditory canal.    Visualized portions of the deep spaces of the suprahyoid neck do not   demonstrate abnormal mass, or enhancement.      IMPRESSION:  Mild microvascular ischemic change.  No abnormal signal or filling defect within either IAC.  No abnormal signal or filling defect within either membranous labyrinth.    < end of copied text >      Consultant(s) Notes Reviewed:      Care Discussed with Consultants/Other Providers:

## 2019-01-28 NOTE — DISCHARGE NOTE ADULT - PLAN OF CARE
To maintain a normal blood pressure to prevent heart attack, stroke and renal failure. Prevent exacerbations and hospitalizations Appropriate follow up and prevent worsening of aneurysm Neurosurgery recommend outpatient follow up with Dr. Hagan for possible serial imaging of the pseudoaneurysm. 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 Low sodium and fat diet, continue anti-hypertensive medications, and follow up with primary care physician. Continue with home asthma medications as prescribed. To help recover or improve as much as possible your sensory and motor abilities, to become more independent, and prevent future strokes. -Follow up with Neurology appointment on 2/1/19 at 9:30 AM  -No evidence of stroke on CT scan or MRI   -Please take ASPIRIN DAILY AND LIPITOR 40mg DAILY   -LDL 76 -Follow up with Neurology appointment on 2/1/19 at 9:30 AM  -No evidence of stroke on CT scan or MRI   -Please take ASPIRIN DAILY AND LIPITOR 40mg DAILY   -LDL 76  -Found to have intracardiac shunt on TTE. LE dopplers negative for DVT. Follow up with neurology.

## 2019-01-28 NOTE — OCCUPATIONAL THERAPY INITIAL EVALUATION ADULT - WEIGHT-BEARING RESTRICTIONS: SIT/STAND, REHAB EVAL
A1c 11.1 in 5/2017, 6.8 in 8/2017. Weaned off Lantus, PRN novolog 10 preprandial  · Continue current therapies  · Monitor A1c  · Discontinue long acting insulin  · PRN novolog for glucose >150  · Has not taken insulin in past 4 mos  
Compliant with Ca and vit D, complains of fatigue  · Supplement with D3 5000U  · monitor  
Elevated MCV, low Hg  · Recently started on oral B complex  · B12 injection at last appt  · Hg improved  
Metastatic renal cell CA (Stage IV - T1,Nx,M1). Followed by Heme-Onc (Dr Reed) radiation therapy to L3 spine - 3 Gy/Fx x 10 fx and pazopanib 800 mg PO daily.  · F/U Heme-Onc  · restaging CTs q3-4 mos  · Continue pazopanib 800mg  · Completed Xgeva for bone mets  · Cotninue Ca and Vit D  
Seen on MRI 5/2017, complains of sciatica. Not physically active, refuses PT  · Refuses PT  · Continue to advise to be physically active  · Plan for medial nerve block with pain management  
full weight-bearing

## 2019-01-28 NOTE — DISCHARGE NOTE ADULT - CARE PROVIDERS DIRECT ADDRESSES
[email or web address]

## 2019-01-28 NOTE — DISCHARGE NOTE ADULT - ADDITIONAL INSTRUCTIONS
Please follow up with your primary care provider within 5 days of discharge. Please follow up with Dr. Hagan (neurology) for ...    For new or worsening symptoms, please return to the hospital. Please follow up with your primary care provider within 5 days of discharge. Please follow up with Dr. Hagan (neurosurgery) for pseudoanuerysm. You have an appointment with NEUROLOGY on FEB 1st at 930AM phone #[phone]. Please call to give the office your insurance information.   CONTINUE TAKE ASPIRIN AND LIPITOR as prescribed- please  at your pharmacy.   For new or worsening symptoms, please return to the hospital.  YOU WERE FOUND TO HAVE AN INTRACARDIAC SHUNT ON YOUR ECHOCARDIOGRAM. PLEASE FOLLOW UP WITH NEUROLOGY.

## 2019-01-28 NOTE — PROGRESS NOTE ADULT - ASSESSMENT
64 yo F w/ hx asthma, vertigo, HTN diet controlled p/w garbled speech now resolved no tPA given. 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 The dome measures 2 to 3 mm in size. The neck measures 2 to 3 mm in size. 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 MRI neg for acute CVA MRI IAC neg

## 2019-01-28 NOTE — PROGRESS NOTE ADULT - PROBLEM SELECTOR PLAN 2
Patient reports this well-controlled at home with lavender oil  -Can consider meclizine if symptoms persist  -MRI IAC neg

## 2019-01-28 NOTE — OCCUPATIONAL THERAPY INITIAL EVALUATION ADULT - PATIENT/FAMILY/SIGNIFICANT OTHER GOALS STATEMENT, OT EVAL
Patient reports she feels at/close to baseline functional status, and wants to return home post-hospital discharge.

## 2019-01-28 NOTE — OCCUPATIONAL THERAPY INITIAL EVALUATION ADULT - MD ORDER
Occupational Therapy (OT) to evaluate and treat. Occupational Therapy (OT) to evaluate and treat. Out of Bed with assistance.

## 2019-01-28 NOTE — OCCUPATIONAL THERAPY INITIAL EVALUATION ADULT - ANTICIPATED DISCHARGE DISPOSITION, OT EVAL
Patient appears to be at/close to baseline functional status, recommend home with no acute OT services required at this time. Pt. will be discharged from inpatient OT services. If any change in pt.'s status noted, please request new consult.

## 2019-01-28 NOTE — OCCUPATIONAL THERAPY INITIAL EVALUATION ADULT - GENERAL OBSERVATIONS, REHAB EVAL
Pt. received semisupine in bed. No acute distress. Patient agreed to evaluation from Occupational Therapist. +Heplock, +Tele. Visitor present bedside.

## 2019-01-28 NOTE — DISCHARGE NOTE ADULT - PROVIDER TOKENS
NATHAN:'[id number]' TOKEN:'[id number]',FREE:[LAST:[Neurology Clinic],PHONE:[(   )    -],FAX:[(   )    -],ADDRESS:[[phone]  [address] [address], [facility] [address]]]

## 2019-01-28 NOTE — OCCUPATIONAL THERAPY INITIAL EVALUATION ADULT - LIVES WITH, PROFILE
Pt. reports she lives alone in an apartment within a house with 5 steps to enter. Once inside, pt. reports she has full flight of steps to negotiate to 2nd floor where apartment is located. Per pt., she has a bathtub in her bathroom.

## 2019-01-28 NOTE — DISCHARGE NOTE ADULT - MEDICATION SUMMARY - MEDICATIONS TO TAKE
I will START or STAY ON the medications listed below when I get home from the hospital:    aspirin 81 mg oral delayed release tablet  -- 1 tab(s) by mouth once a day  -- Indication: For TIA     Lipitor 40 mg oral tablet  -- 1 tab(s) by mouth once a day (at bedtime)   -- Avoid grapefruit and grapefruit juice while taking this medication.  Do not take this drug if you are pregnant.  It is very important that you take or use this exactly as directed.  Do not skip doses or discontinue unless directed by your doctor.  Obtain medical advice before taking any non-prescription drugs as some may affect the action of this medication.  Take with food or milk.    -- Indication: For HLD     albuterol 90 mcg/inh inhalation aerosol  -- 2 puff(s) inhaled 4 times a day, As Needed for season allergies  -- Indication: For Asthma

## 2019-01-28 NOTE — DISCHARGE NOTE ADULT - PATIENT PORTAL LINK FT
You can access the [facility] Patient Portal, offered by [facility], by registering with the following website: [email or web address]

## 2019-01-29 VITALS — WEIGHT: 123.68 LBS

## 2019-01-29 PROBLEM — R42 DIZZINESS AND GIDDINESS: Chronic | Status: ACTIVE | Noted: 2019-01-26

## 2019-01-29 PROBLEM — I10 ESSENTIAL (PRIMARY) HYPERTENSION: Chronic | Status: ACTIVE | Noted: 2019-01-26

## 2019-01-29 PROBLEM — J45.909 UNSPECIFIED ASTHMA, UNCOMPLICATED: Chronic | Status: ACTIVE | Noted: 2019-01-26

## 2019-01-29 PROBLEM — Z00.00 ENCOUNTER FOR PREVENTIVE HEALTH EXAMINATION: Status: ACTIVE | Noted: 2019-01-29

## 2019-01-29 LAB
ANION GAP SERPL CALC-SCNC: 9 MMO/L — SIGNIFICANT CHANGE UP (ref 7–14)
BASOPHILS # BLD AUTO: 0.03 K/UL — SIGNIFICANT CHANGE UP (ref 0–0.2)
BASOPHILS NFR BLD AUTO: 0.6 % — SIGNIFICANT CHANGE UP (ref 0–2)
BUN SERPL-MCNC: 16 MG/DL — SIGNIFICANT CHANGE UP (ref 7–23)
CALCIUM SERPL-MCNC: 9.3 MG/DL — SIGNIFICANT CHANGE UP (ref 8.4–10.5)
CHLORIDE SERPL-SCNC: 103 MMOL/L — SIGNIFICANT CHANGE UP (ref 98–107)
CO2 SERPL-SCNC: 27 MMOL/L — SIGNIFICANT CHANGE UP (ref 22–31)
CREAT SERPL-MCNC: 0.97 MG/DL — SIGNIFICANT CHANGE UP (ref 0.5–1.3)
EOSINOPHIL # BLD AUTO: 0.12 K/UL — SIGNIFICANT CHANGE UP (ref 0–0.5)
EOSINOPHIL NFR BLD AUTO: 2.3 % — SIGNIFICANT CHANGE UP (ref 0–6)
GLUCOSE SERPL-MCNC: 94 MG/DL — SIGNIFICANT CHANGE UP (ref 70–99)
HCT VFR BLD CALC: 40.3 % — SIGNIFICANT CHANGE UP (ref 34.5–45)
HGB BLD-MCNC: 13.2 G/DL — SIGNIFICANT CHANGE UP (ref 11.5–15.5)
IMM GRANULOCYTES NFR BLD AUTO: 0.2 % — SIGNIFICANT CHANGE UP (ref 0–1.5)
LYMPHOCYTES # BLD AUTO: 1.21 K/UL — SIGNIFICANT CHANGE UP (ref 1–3.3)
LYMPHOCYTES # BLD AUTO: 23.2 % — SIGNIFICANT CHANGE UP (ref 13–44)
MAGNESIUM SERPL-MCNC: 2.2 MG/DL — SIGNIFICANT CHANGE UP (ref 1.6–2.6)
MCHC RBC-ENTMCNC: 32.8 % — SIGNIFICANT CHANGE UP (ref 32–36)
MCHC RBC-ENTMCNC: 33.5 PG — SIGNIFICANT CHANGE UP (ref 27–34)
MCV RBC AUTO: 102.3 FL — HIGH (ref 80–100)
MONOCYTES # BLD AUTO: 0.47 K/UL — SIGNIFICANT CHANGE UP (ref 0–0.9)
MONOCYTES NFR BLD AUTO: 9 % — SIGNIFICANT CHANGE UP (ref 2–14)
NEUTROPHILS # BLD AUTO: 3.38 K/UL — SIGNIFICANT CHANGE UP (ref 1.8–7.4)
NEUTROPHILS NFR BLD AUTO: 64.7 % — SIGNIFICANT CHANGE UP (ref 43–77)
NRBC # FLD: 0 K/UL — LOW (ref 25–125)
PHOSPHATE SERPL-MCNC: 3.9 MG/DL — SIGNIFICANT CHANGE UP (ref 2.5–4.5)
PLATELET # BLD AUTO: 285 K/UL — SIGNIFICANT CHANGE UP (ref 150–400)
PMV BLD: 9.7 FL — SIGNIFICANT CHANGE UP (ref 7–13)
POTASSIUM SERPL-MCNC: 3.9 MMOL/L — SIGNIFICANT CHANGE UP (ref 3.5–5.3)
POTASSIUM SERPL-SCNC: 3.9 MMOL/L — SIGNIFICANT CHANGE UP (ref 3.5–5.3)
RBC # BLD: 3.94 M/UL — SIGNIFICANT CHANGE UP (ref 3.8–5.2)
RBC # FLD: 12.1 % — SIGNIFICANT CHANGE UP (ref 10.3–14.5)
SODIUM SERPL-SCNC: 139 MMOL/L — SIGNIFICANT CHANGE UP (ref 135–145)
WBC # BLD: 5.22 K/UL — SIGNIFICANT CHANGE UP (ref 3.8–10.5)
WBC # FLD AUTO: 5.22 K/UL — SIGNIFICANT CHANGE UP (ref 3.8–10.5)

## 2019-01-29 PROCEDURE — 93970 EXTREMITY STUDY: CPT | Mod: 26

## 2019-01-29 PROCEDURE — 99239 HOSP IP/OBS DSCHRG MGMT >30: CPT

## 2019-01-29 RX ORDER — POTASSIUM CHLORIDE 20 MEQ
40 PACKET (EA) ORAL ONCE
Qty: 0 | Refills: 0 | Status: COMPLETED | OUTPATIENT
Start: 2019-01-29 | End: 2019-01-29

## 2019-01-29 RX ORDER — ATORVASTATIN CALCIUM 80 MG/1
1 TABLET, FILM COATED ORAL
Qty: 60 | Refills: 0 | OUTPATIENT
Start: 2019-01-29 | End: 2019-03-29

## 2019-01-29 RX ORDER — ASPIRIN/CALCIUM CARB/MAGNESIUM 324 MG
1 TABLET ORAL
Qty: 60 | Refills: 0 | OUTPATIENT
Start: 2019-01-29 | End: 2019-03-29

## 2019-01-29 RX ADMIN — ENOXAPARIN SODIUM 40 MILLIGRAM(S): 100 INJECTION SUBCUTANEOUS at 11:57

## 2019-01-29 RX ADMIN — Medication 40 MILLIEQUIVALENT(S): at 11:55

## 2019-01-29 RX ADMIN — Medication 81 MILLIGRAM(S): at 11:55

## 2019-01-29 NOTE — PROGRESS NOTE ADULT - PROBLEM SELECTOR PLAN 1
Dizziness is improving/speech likely TIA  - A1c-5.1 LDL-76  -c/w telemetry monitoring, no events thus far  -Continue with aspirin 81m, Lipitor 80mg po QHS   -MRI brain-neg for CVA MRI IAC also neg   -PT - no skilled needs  - Echo w/bubble study-possible intracardiac shunt   -check LE duplex r/o DVT  -case d/w neuro resident likely TIA outpt f/u in neuro clinic if no DVT found

## 2019-01-29 NOTE — PROGRESS NOTE ADULT - PROBLEM SELECTOR PLAN 2
Patient reports this well-controlled at home with lavender oil  -Can consider meclizine if symptoms persist  -MRI IAC neg  -symptoms resolved

## 2019-01-29 NOTE — PROGRESS NOTE ADULT - ATTENDING COMMENTS
discharge 38 min Neuro f/u [phone]
-if TTE neg likely discharge with outpt f/u with neuro. 37 min

## 2019-01-29 NOTE — PROGRESS NOTE ADULT - SUBJECTIVE AND OBJECTIVE BOX
Patient is a 63y old  Female who presents with a chief complaint of Dizziness (28 Jan 2019 13:44)      SUBJECTIVE / OVERNIGHT EVENTS: Pt in NAD TTE showed possible     MEDICATIONS  (STANDING):  aspirin enteric coated 81 milliGRAM(s) Oral daily  atorvastatin 80 milliGRAM(s) Oral at bedtime  enoxaparin Injectable 40 milliGRAM(s) SubCutaneous every 24 hours  potassium chloride    Tablet ER 40 milliEquivalent(s) Oral once    MEDICATIONS  (PRN):        CAPILLARY BLOOD GLUCOSE        I&O's Summary      T(C): 36.4 (01-29-19 @ 06:20), Max: 36.7 (01-28-19 @ 11:39)  HR: 68 (01-29-19 @ 06:20) (62 - 68)  BP: 135/87 (01-29-19 @ 06:20) (135/87 - 151/87)  RR: 18 (01-29-19 @ 06:20) (18 - 18)  SpO2: 100% (01-29-19 @ 06:20) (98% - 100%)    PHYSICAL EXAM:  GENERAL: NAD, well-developed  HEAD:  Atraumatic, Normocephalic  EYES: EOMI, PERRLA, conjunctiva and sclera clear  NECK: Supple, No JVD  CHEST/LUNG: Clear to auscultation bilaterally; No wheeze  HEART: Regular rate and rhythm; No murmurs, rubs, or gallops  ABDOMEN: Soft, Nontender, Nondistended; Bowel sounds present  EXTREMITIES:  2+ Peripheral Pulses, No clubbing, cyanosis, or edema  PSYCH: AAOx3  NEUROLOGY: non-focal  SKIN: No rashes or lesions    LABS:                        13.2   5.22  )-----------( 285      ( 29 Jan 2019 07:10 )             40.3     01-29    139  |  103  |  16  ----------------------------<  94  3.9   |  27  |  0.97    Ca    9.3      29 Jan 2019 07:10  Phos  3.9     01-29  Mg     2.2     01-29                  RADIOLOGY & ADDITIONAL TESTS:< from: Transthoracic Echocardiogram (01.28.19 @ 10:31) >  CONCLUSIONS:  1. Normal trileaflet aortic valve. Mild aortic  regurgitation.  2. Increased relative wall thickness with normal left  ventricular mass index, consistent with concentric left  ventricular remodeling.  3. No segmental wall motion abnormalities. Hyperdynamic  left ventricle.  4. Normal right ventricular size and function.  5. Normal tricuspid valve. Mild tricuspid regurgitation.  6. Agitated saline injection and color flow Doppler  demonstrate evidence of a possible intracardiac shunt.    < end of copied text >          Consultant(s) Notes Reviewed:      Care Discussed with Consultants/Other Providers:

## 2019-01-31 ENCOUNTER — RECORD ABSTRACTING (OUTPATIENT)
Age: 64
End: 2019-01-31

## 2019-02-01 ENCOUNTER — APPOINTMENT (OUTPATIENT)
Dept: NEUROLOGY | Facility: CLINIC | Age: 64
End: 2019-02-01
Payer: MEDICARE

## 2019-02-01 VITALS
BODY MASS INDEX: 21.51 KG/M2 | SYSTOLIC BLOOD PRESSURE: 147 MMHG | HEIGHT: 64 IN | HEART RATE: 76 BPM | WEIGHT: 126 LBS | DIASTOLIC BLOOD PRESSURE: 79 MMHG

## 2019-02-01 DIAGNOSIS — I10 ESSENTIAL (PRIMARY) HYPERTENSION: ICD-10-CM

## 2019-02-01 PROCEDURE — 99205 OFFICE O/P NEW HI 60 MIN: CPT

## 2019-02-01 RX ORDER — ATORVASTATIN CALCIUM 40 MG/1
40 TABLET, FILM COATED ORAL
Refills: 0 | Status: ACTIVE | COMMUNITY

## 2019-02-01 RX ORDER — ASPIRIN 81 MG
81 TABLET, DELAYED RELEASE (ENTERIC COATED) ORAL
Refills: 0 | Status: ACTIVE | COMMUNITY

## 2019-03-08 ENCOUNTER — APPOINTMENT (OUTPATIENT)
Dept: NEUROLOGY | Facility: CLINIC | Age: 64
End: 2019-03-08
Payer: MEDICARE

## 2019-03-08 VITALS
BODY MASS INDEX: 21.51 KG/M2 | WEIGHT: 126 LBS | SYSTOLIC BLOOD PRESSURE: 159 MMHG | HEIGHT: 64 IN | DIASTOLIC BLOOD PRESSURE: 95 MMHG | HEART RATE: 98 BPM

## 2019-03-08 DIAGNOSIS — R42 DIZZINESS AND GIDDINESS: ICD-10-CM

## 2019-03-08 DIAGNOSIS — I10 ESSENTIAL (PRIMARY) HYPERTENSION: ICD-10-CM

## 2019-03-08 DIAGNOSIS — G45.9 TRANSIENT CEREBRAL ISCHEMIC ATTACK, UNSPECIFIED: ICD-10-CM

## 2019-03-08 DIAGNOSIS — F41.9 ANXIETY DISORDER, UNSPECIFIED: ICD-10-CM

## 2019-03-08 PROCEDURE — 99215 OFFICE O/P EST HI 40 MIN: CPT

## 2019-03-08 NOTE — PHYSICAL EXAM
[General Appearance - Alert] : alert [General Appearance - In No Acute Distress] : in no acute distress [Oriented To Time, Place, And Person] : oriented to person, place, and time [Impaired Insight] : insight and judgment were intact [Affect] : the affect was normal [Person] : oriented to person [Place] : oriented to place [Time] : oriented to time [Concentration Intact] : normal concentrating ability [Visual Intact] : visual attention was ~T not ~L decreased [Naming Objects] : no difficulty naming common objects [Repeating Phrases] : no difficulty repeating a phrase [Writing A Sentence] : no difficulty writing a sentence [Fluency] : fluency intact [Comprehension] : comprehension intact [Reading] : reading intact [Past History] : adequate knowledge of personal past history [Cranial Nerves Optic (II)] : visual acuity intact bilaterally,  visual fields full to confrontation, pupils equal round and reactive to light [Cranial Nerves Oculomotor (III)] : extraocular motion intact [Cranial Nerves Trigeminal (V)] : facial sensation intact symmetrically [Cranial Nerves Facial (VII)] : face symmetrical [Cranial Nerves Vestibulocochlear (VIII)] : hearing was intact bilaterally [Cranial Nerves Glossopharyngeal (IX)] : tongue and palate midline [Cranial Nerves Accessory (XI - Cranial And Spinal)] : head turning and shoulder shrug symmetric [Cranial Nerves Hypoglossal (XII)] : there was no tongue deviation with protrusion [Motor Tone] : muscle tone was normal in all four extremities [Motor Strength] : muscle strength was normal in all four extremities [No Muscle Atrophy] : normal bulk in all four extremities [Motor Handedness Right-Handed] : the patient is right hand dominant [Paresis Pronator Drift Right-Sided] : no pronator drift on the right [Paresis Pronator Drift Left-Sided] : no pronator drift on the left [Motor Strength Upper Extremities Bilaterally] : strength was normal in both upper extremities [Motor Strength Lower Extremities Bilaterally] : strength was normal in both lower extremities [5] : ankle plantar flexion 5/5 [Sensation Tactile Decrease] : light touch was intact [Abnormal Walk] : normal gait [Balance] : balance was intact [Limited Balance] : balance was intact [Past-pointing] : there was no past-pointing [Tremor] : no tremor present [Coordination - Dysmetria Impaired Heel-to-Shin Bilateral] : not present [Plantar Reflex Right Only] : normal on the right [Plantar Reflex Left Only] : normal on the left [FreeTextEntry8] : slight dysmetria noted on left  [Sclera] : the sclera and conjunctiva were normal [PERRL With Normal Accommodation] : pupils were equal in size, round, reactive to light, with normal accommodation

## 2019-03-08 NOTE — HISTORY OF PRESENT ILLNESS
[FreeTextEntry1] : Ms. Bhakta is a 63 year old female right handed PMHx HTN (previously diet controlled), Asthma, vertigo (previously controlled with lavender oil) who presents today for post hospitalization follow up after a acute onset of dizziness, vomiting and sensation changes on 1/26/19. She underwent extensive work up as described below:\par \par CT HEAD: No CT evidence of an acute territorial infarct. No acute intracranial bleeding, mass effect, or shift. \par POSTCONTRAST HEAD CT: No hydrocephalus, midline shift, parenchymal hemorrhage, vasogenic edema, or evidence of acute territorial infarct.\par INTRACRANIAL CTA: No vascular aneurysm or flow-limiting stenosis.\par NECK CTA: Posteriorly directed 2 to 3 mm aneurysm of the distal right internal carotid artery. The neck measures 2 to 3 mm in size. 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 No flow-limiting stenosis. Carotid bifurcations unremarkable. No evidence for acute arterial dissection.\par MRI HEAD: Mild microvascular ischemic change. No abnormal signal or filling defect within either IAC. No abnormal signal or filling defect within either membranous labyrinth. 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 \par \par TTE: \par 1. Normal trileaflet aortic valve. Mild aortic regurgitation.\par 2. Increased relative wall thickness with normal left ventricular mass index, consistent with concentric left ventricular remodeling.\par 3. No segmental wall motion abnormalities. Hyperdynamic left ventricle.\par 4. Normal right ventricular size and function.\par 5. Normal tricuspid valve. Mild tricuspid regurgitation.\par 6. Agitated saline injection and color flow Doppler demonstrate evidence of a possible intracardiac shunt.\par EF 65%\par \par LE DOPPLERS:\par 1.  No evidence of deep vein thrombosis in the right and left lower extremities.\par 2.  No evidence of deep and superficial venous insufficiency noted in the right and left lower extremities. \par \par She has remained neurologically stable. Still has some vertigo episodes more self resolved within minutes. Currently on ASA 81 mg and Atorvastatin 40mg. She states has been feeling anxious and probable her anxiety contributes to high blood pressure. Attempts to lower her BP in the past have resulted as per patient in hypotensive episodes. Of note the day of the event she was found hypotensive. \par She was recently evaluated by cardiologist and pending echocardiogram. She schedule as well an appointment with ENT. \par

## 2019-03-08 NOTE — DISCUSSION/SUMMARY
[FreeTextEntry1] : Impression: Acute onset of dizziness, vomiting and BL LE sensation changes etiology TIA vs Vertigo/ Vestibular dysfunction. A small incidental chronic right AICA territory infarction involving the right anterior inferior cerebellum was seen on imaging likely small vessel disease. 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 \par \par - Continue ASA 81 mg once daily for secondary stroke prevention\par -BP diary\par - Continue to monitor BP at home and notify PCP/Cardiology for readings >140/90. Educated on medication compliance and BP monitoring to prevent secondary stroke and systemic problems \par - Follow up with ENT for further evaluation of vertigo. Patient with a long history of vertigo like symptoms. ENT to perform ENG\par - Statin therapy can discontinue and follow up Lipid panel goal LDL <100\par - Follow up with PCP for  monitoring of HbA1c (5.1) and cholesterol profile (goal LDL <100), to modify vascular risk factors \par - Follow up with cardiology echocardiogram and 30 day holter monitoring-\par - Carotid Doppler's and TCD to be obtain with next visit ??\par - Educated on stroke/TIA signs/bleeding signs and symptoms and to call 911 if experiencing any of these symptoms\par - Follow up with psychiatry for evaluation of anxiety and depression  \par -Will re-evaluate with repeated imaging August 2019 \par \par All concerns and questions were addressed.\par   [Antithrombotic therapy with ___] : antithrombotic therapy with  [unfilled] [Intensive Blood Pressure Control] : intensive blood pressure control [Lipid Lowering Therapy] : lipid lowering therapy [Patient encouraged to discuss with Primary MD] : I encouraged the patient to discuss these important issues with ~his/her~ primary care doctor [Goals and Counseling] : I have reviewed the goals of stroke risk factor modification. I counseled the patient on measures to reduce stroke risk, including the importance of medication compliance, risk factor control, exercise, healthy diet and avoidance of smoking. I reviewed stroke warning signs and symptoms and appropriate actions to take if such occur.

## 2019-03-08 NOTE — REVIEW OF SYSTEMS
[Feeling Tired] : feeling tired [Suicidal] : not suicidal [Anxiety] : anxiety [Depression] : depression [Palpitations] : palpitations [Negative] : Heme/Lymph [de-identified] : denies suicidal ideation

## 2019-03-11 ENCOUNTER — OTHER (OUTPATIENT)
Age: 64
End: 2019-03-11

## 2019-04-01 NOTE — STROKE CODE NOTE - NIH STROKE SCALE: 3. VISUAL, QM
Carmen Smith  [address] [facility] [address]            4/1/2019      Dear Carmen,     I am pleased to inform you that your recent pelvic ultrasound is normal.  If you have any questions or concerns, please make a note of them and bring to your next appointment.     Sincerely,     Kamilah Horton MD  [facility] [address]  [address] [place] [id number] [phone]   (0) No visual loss

## 2019-07-09 ENCOUNTER — OTHER (OUTPATIENT)
Age: 64
End: 2019-07-09

## 2019-10-01 ENCOUNTER — APPOINTMENT (OUTPATIENT)
Dept: NEUROLOGY | Facility: CLINIC | Age: 64
End: 2019-10-01

## 2019-10-18 ENCOUNTER — APPOINTMENT (OUTPATIENT)
Dept: NEUROLOGY | Facility: CLINIC | Age: 64
End: 2019-10-18
Payer: MEDICARE

## 2019-10-18 VITALS
WEIGHT: 126 LBS | SYSTOLIC BLOOD PRESSURE: 176 MMHG | TEMPERATURE: 98.3 F | HEART RATE: 70 BPM | HEIGHT: 64 IN | DIASTOLIC BLOOD PRESSURE: 98 MMHG | OXYGEN SATURATION: 93 % | BODY MASS INDEX: 21.51 KG/M2 | RESPIRATION RATE: 17 BRPM

## 2019-10-18 DIAGNOSIS — I72.0 ANEURYSM OF CAROTID ARTERY: ICD-10-CM

## 2019-10-18 DIAGNOSIS — I69.30 UNSPECIFIED SEQUELAE OF CEREBRAL INFARCTION: ICD-10-CM

## 2019-10-18 PROCEDURE — 99214 OFFICE O/P EST MOD 30 MIN: CPT

## 2019-11-22 ENCOUNTER — OUTPATIENT (OUTPATIENT)
Dept: OUTPATIENT SERVICES | Facility: HOSPITAL | Age: 64
LOS: 1 days | End: 2019-11-22

## 2019-11-22 DIAGNOSIS — Z01.89 ENCOUNTER FOR OTHER SPECIFIED SPECIAL EXAMINATIONS: ICD-10-CM

## 2020-04-22 ENCOUNTER — APPOINTMENT (OUTPATIENT)
Dept: NEUROLOGY | Facility: CLINIC | Age: 65
End: 2020-04-22

## 2020-12-15 NOTE — ED PROVIDER NOTE - NS ED MD EM SELECTION
Called and phone number was not working. Called to verify phone number with pharmacy. Called his wife. Phone numbers updated. Wife voiced understanding of patient needing an appointment before his next refill is due. [id number] Detailed
